# Patient Record
Sex: FEMALE | Race: WHITE | NOT HISPANIC OR LATINO | Employment: OTHER | URBAN - NONMETROPOLITAN AREA
[De-identification: names, ages, dates, MRNs, and addresses within clinical notes are randomized per-mention and may not be internally consistent; named-entity substitution may affect disease eponyms.]

---

## 2021-07-27 ENCOUNTER — OFFICE VISIT (OUTPATIENT)
Dept: CARDIOLOGY | Facility: CLINIC | Age: 41
End: 2021-07-27

## 2021-07-27 ENCOUNTER — PATIENT ROUNDING (BHMG ONLY) (OUTPATIENT)
Dept: CARDIOLOGY | Facility: CLINIC | Age: 41
End: 2021-07-27

## 2021-07-27 VITALS
SYSTOLIC BLOOD PRESSURE: 120 MMHG | WEIGHT: 187.4 LBS | DIASTOLIC BLOOD PRESSURE: 80 MMHG | HEIGHT: 63 IN | BODY MASS INDEX: 33.2 KG/M2 | HEART RATE: 76 BPM

## 2021-07-27 DIAGNOSIS — R01.1 MURMUR, CARDIAC: ICD-10-CM

## 2021-07-27 DIAGNOSIS — E55.9 VITAMIN D DEFICIENCY: ICD-10-CM

## 2021-07-27 DIAGNOSIS — E78.00 HYPERCHOLESTEREMIA: ICD-10-CM

## 2021-07-27 DIAGNOSIS — E88.81 METABOLIC SYNDROME: ICD-10-CM

## 2021-07-27 DIAGNOSIS — R00.0 TACHYCARDIA: Primary | ICD-10-CM

## 2021-07-27 PROCEDURE — 93000 ELECTROCARDIOGRAM COMPLETE: CPT | Performed by: INTERNAL MEDICINE

## 2021-07-27 PROCEDURE — 99244 OFF/OP CNSLTJ NEW/EST MOD 40: CPT | Performed by: INTERNAL MEDICINE

## 2021-07-27 RX ORDER — CHOLECALCIFEROL (VITAMIN D3) 50 MCG
2000 TABLET ORAL DAILY
COMMUNITY

## 2021-07-27 NOTE — PROGRESS NOTES
July 27, 2021    Hello, may I speak with Tika Agee?    My name is Narcisa Merritt       I am  with [facility]  [facility]  [address] [place] [address] [phone].    Before we get started may I verify your date of birth? 1980    I am calling to officially welcome you to our practice and ask about your recent visit. Is this a good time to talk? yes    Tell me about your visit with us. What things went well?  Doctor and everyone was very nice and didn't have to wait very long .       We're always looking for ways to make our patients' experiences even better. Do you have recommendations on ways we may improve?  no-no problems     Overall were you satisfied with your first visit to our practice? Yes it was great        I appreciate you taking the time to speak with me today. Is there anything else I can do for you? no      Thank you, and have a great day.

## 2021-07-27 NOTE — PROGRESS NOTES
Chief Complaint   Patient presents with   • Establish Care     Referred per PCP for Tachycardia . Patient has not worn a Cardiac monitor at this time. She has never saw Cardiology   • Rapid Heart Rate     Patient reports wearing a Fitbit and it has shown HR as high as 200 on occasion . she has no symptoms when this happens   • LABS     Current labs 2021 on chart        CARDIAC COMPLAINTS  Tachycardia      Subjective   Tika Agee is a 41 y.o. female came in today for her initial cardiac evaluation.  She has no previously diagnosed cardiac history other than history of hypercholesterolemia in the past.  She was seen at your office for routine checkup and she mentioned that Fitbit showed that her heart rate has been over 200 couple of times over the last week but she also said that she has been having these episodes about once or twice a month.  It occurs when she does any exertion and it also occurred on a day when she woke up and there was a record of more than 200 heart rate while she was sleeping.  She is completely asymptomatic.  She denies having any palpitation, dizziness or loss of consciousness.  She denies having any increased sweating.  She does notice some increase in the fatigability.  She did undergo some lab work and found to have a normal blood count, her cholesterol was elevated at 217 with the LDL of 139.  Her thyroid function test was normal.  Her electrolytes, renal function and liver function was normal.  She has no history of smoking, alcohol or drug abuse.  Her mother has hypertension.  Sister  with hydrocephalus.  No history of any sudden cardiac arrest in the family.  She does not remember anyone in the family had a pacemaker.  History reviewed. No pertinent surgical history.    Current Outpatient Medications   Medication Sig Dispense Refill   • Cholecalciferol (Vitamin D) 50 MCG ( [place]) tablet Take 2,000 Units by mouth Daily.       No current facility-administered medications for  "this visit.           ALLERGIES:  Patient has no known allergies.    Past Medical History:   Diagnosis Date   • Hyperlipidemia        Social History     Tobacco Use   Smoking Status Never Smoker   Smokeless Tobacco Never Used          Family History   Problem Relation Age of Onset   • Hypertension Mother    • Hydrocephalus Sister    • Breast cancer Maternal Grandmother    • Hypertension Maternal Grandmother    • Brain cancer Maternal Grandfather        Review of Systems   Constitutional: Negative for decreased appetite and malaise/fatigue.   HENT: Negative for congestion and sore throat.    Eyes: Negative for blurred vision.   Cardiovascular: Negative for chest pain and palpitations.   Respiratory: Negative for shortness of breath and snoring.    Endocrine: Negative for cold intolerance and heat intolerance.   Hematologic/Lymphatic: Negative for adenopathy. Does not bruise/bleed easily.   Skin: Negative for itching, nail changes and skin cancer.   Musculoskeletal: Negative for arthritis and myalgias.   Gastrointestinal: Negative for abdominal pain, dysphagia and heartburn.   Genitourinary: Negative for bladder incontinence and frequency.   Neurological: Negative for dizziness, light-headedness, seizures and vertigo.   Psychiatric/Behavioral: Negative for altered mental status.   Allergic/Immunologic: Negative for environmental allergies and hives.       Diabetes- No  Thyroid- normal    Objective     /80 (BP Location: Left arm)   Pulse 76   Ht 160 cm (63\")   Wt 85 kg (187 lb 6.4 oz)   BMI 33.20 kg/m²     Vitals and nursing note reviewed.   Constitutional:       Appearance: Healthy appearance. Not in distress.   Eyes:      Conjunctiva/sclera: Conjunctivae normal.      Pupils: Pupils are equal, round, and reactive to light.   HENT:      Head: Normocephalic.   Pulmonary:      Breath sounds: Normal breath sounds.   Cardiovascular:      PMI at left midclavicular line. Normal rate. Regular rhythm.      Murmurs: " There is a systolic murmur.   Abdominal:      General: Bowel sounds are normal.      Palpations: Abdomen is soft.   Musculoskeletal: Normal range of motion.      Cervical back: Normal range of motion and neck supple. Skin:     General: Skin is warm and dry.   Neurological:      Mental Status: Alert, oriented to person, place, and time and oriented to person, place and time.           ECG 12 Lead    Date/Time: 7/27/2021 1:40 PM  Performed by: Haris Vilchis MD  Authorized by: Haris Vilchis MD   Comparison: compared with previous ECG from 6/25/2021  Similar to previous ECG  Rhythm: sinus rhythm  Rate: normal  QRS axis: normal  Other findings: left atrial abnormality    Clinical impression: non-specific ECG              Assessment/Plan   Patient's Body mass index is 33.2 kg/m². indicating that she is obese (BMI >30). Obesity-related health conditions include the following: none. Obesity is newly identified. BMI is is above average; BMI management plan is completed. We discussed low calorie, low carb based diet program, portion control and increasing exercise..     Diagnoses and all orders for this visit:    1. Tachycardia (Primary)  -     Adult Transthoracic Echo Complete W/ Cont if Necessary Per Protocol; Future  -     Mobile Cardiac Outpatient Telemetry; Future    2. Hypercholesteremia    3. Metabolic syndrome    4. Vitamin D deficiency    5. Murmur, cardiac  -     Adult Transthoracic Echo Complete W/ Cont if Necessary Per Protocol; Future       At baseline her heart rate is stable.  Her blood pressure is normal.  Her EKG shows normal sinus rhythm, normal HI interval, left atrial enlargement.  The QTc interval is normal.  She has no delta waves noted.  Her clinical examination reveals a BMI around 33.  Her cardiovascular examination reveals short systolic murmur at the mitral area.  She has normal peripheral pulse and no pedal edema    Regarding the tachycardia, I am not sure whether the Fitbit is  reading it correctly or is it is an artifact but since she did have an episode while she was sleeping need to rule out whether she had any episodes of PAF or PSVT.  I placed an extended monitor for the next 1 month to see whether there is any transient asymptomatic arrhythmia.  If it is due to PSVT then we can either try with a low-dose of beta-blockers or class Ic antiarrhythmic medication.  I also schedule her to undergo an echocardiogram to evaluate for any structural heart disease.  She also needed to evaluate the LV wall thickness, valvular structures as well as the PA pressure    Regarding her hypercholesterolemia, it is slightly elevated but I do not think she needs to be on any medication at this time.  She can try strict dietary restriction.  Given the fact that she is also has metabolic syndrome, I talked to her about Mediterranean diet and gave her papers regarding that.  She needs to have her lipids rechecked in about 6 months.    Regarding the cardiac murmur, it appears to be more of mitral regurgitation.  Will review the echocardiogram to evaluate the LV function, valvular structures as well as the PA pressure.    Based on the results of the monitor, further recommendations will be made.                 Electronically signed by Haris Vilchis MD July 27, 2021 13:27 EDT

## 2021-07-27 NOTE — PATIENT INSTRUCTIONS
Mediterranean Diet  A Mediterranean diet refers to food and lifestyle choices that are based on the traditions of countries located on the [place]. This way of eating has been shown to help prevent certain conditions and improve outcomes for people who have chronic diseases, like kidney disease and heart disease.  What are tips for following this plan?  Lifestyle  · Cook and eat meals together with your family, when possible.  · Drink enough fluid to keep your urine clear or pale yellow.  · Be physically active every day. This includes:  ? Aerobic exercise like running or swimming.  ? Leisure activities like gardening, walking, or housework.  · Get 7-8 hours of sleep each night.  · If recommended by your health care provider, drink red wine in moderation. This means 1 glass a day for nonpregnant women and 2 glasses a day for men. A glass of wine equals 5 oz (150 mL).  Reading food labels    · Check the serving size of packaged foods. For foods such as rice and pasta, the serving size refers to the amount of cooked product, not dry.  · Check the total fat in packaged foods. Avoid foods that have saturated fat or trans fats.  · Check the ingredients list for added sugars, such as corn syrup.  Shopping  · At the grocery store, buy most of your food from the areas near the walls of the store. This includes:  ? Fresh fruits and vegetables (produce).  ? Grains, beans, nuts, and seeds. Some of these may be available in unpackaged forms or large amounts (in bulk).  ? Fresh seafood.  ? Poultry and eggs.  ? Low-fat dairy products.  · Buy whole ingredients instead of prepackaged foods.  · Buy fresh fruits and vegetables in-season from local farmers markets.  · Buy frozen fruits and vegetables in resealable bags.  · If you do not have access to quality fresh seafood, buy precooked frozen shrimp or canned fish, such as tuna, salmon, or sardines.  · Buy small amounts of raw or cooked vegetables, salads, or olives from  the deli or salad bar at your store.  · Stock your pantry so you always have certain foods on hand, such as olive oil, canned tuna, canned tomatoes, rice, pasta, and beans.  Cooking  · Cook foods with extra-virgin olive oil instead of using butter or other vegetable oils.  · Have meat as a side dish, and have vegetables or grains as your main dish. This means having meat in small portions or adding small amounts of meat to foods like pasta or stew.  · Use beans or vegetables instead of meat in common dishes like chili or lasagna.  · [place] with different cooking methods. Try roasting or broiling vegetables instead of steaming or sautéeing them.  · Add frozen vegetables to soups, stews, pasta, or rice.  · Add nuts or seeds for added healthy fat at each meal. You can add these to yogurt, salads, or vegetable dishes.  · Marinate fish or vegetables using olive oil, lemon juice, garlic, and fresh herbs.  Meal planning    · Plan to eat 1 vegetarian meal one day each week. Try to work up to 2 vegetarian meals, if possible.  · Eat seafood 2 or more times a week.  · Have healthy snacks readily available, such as:  ? Vegetable sticks with hummus.  ? Greek yogurt.  ? Fruit and nut trail mix.  · Eat balanced meals throughout the week. This includes:  ? Fruit: 2-3 servings a day  ? Vegetables: 4-5 servings a day  ? Low-fat dairy: 2 servings a day  ? Fish, poultry, or lean meat: 1 serving a day  ? Beans and legumes: 2 or more servings a week  ? Nuts and seeds: 1-2 servings a day  ? Whole grains: 6-8 servings a day  ? Extra-virgin olive oil: 3-4 servings a day  · Limit red meat and sweets to only a few servings a month  What are my food choices?  · Mediterranean diet  ? Recommended  § Grains: Whole-grain pasta. Brown rice. Bulgar wheat. Polenta. Couscous. Whole-wheat bread. Oatmeal. Quinoa.  § Vegetables: Artichokes. Beets. Broccoli. Cabbage. Carrots. Eggplant. Green beans. Chard. Kale. Spinach. Onions. Leeks. Peas. Squash.  Tomatoes. Peppers. Radishes.  § Fruits: Apples. Apricots. Avocado. Berries. Bananas. Cherries. Dates. Figs. Grapes. Sergio. Melon. Oranges. Peaches. Plums. Pomegranate.  § Meats and other protein foods: Beans. Almonds. Sunflower seeds. Pine nuts. Peanuts. Cod. [place]. Scallops. Shrimp. Tuna. Tilapia. Clams. Oysters. Eggs.  § Dairy: Low-fat milk. Cheese. Greek yogurt.  § Beverages: Water. Red wine. Herbal tea.  § Fats and oils: Extra virgin olive oil. Avocado oil. Grape seed oil.  § Sweets and desserts: Greek yogurt with honey. Baked apples. Poached pears. Trail mix.  § Seasoning and other foods: Basil. Cilantro. Coriander. Cumin. Mint. Parsley. [place]. Rosemary. Tarragon. Garlic. Oregano. Thyme. Pepper. Balsalmic vinegar. Tahini. Hummus. Tomato sauce. Olives. Mushrooms.  ? Limit these  § Grains: Prepackaged pasta or rice dishes. Prepackaged cereal with added sugar.  § Vegetables: Deep fried potatoes (french fries).  § Fruits: Fruit canned in syrup.  § Meats and other protein foods: Beef. Pork. Lamb. Poultry with skin. Hot dogs. Joshi.  § Dairy: Ice cream. Sour cream. Whole milk.  § Beverages: Juice. Sugar-sweetened soft drinks. Beer. Liquor and spirits.  § Fats and oils: Butter. Canola oil. Vegetable oil. Beef fat (tallow). Lard.  § Sweets and desserts: Cookies. Cakes. Pies. Candy.  § Seasoning and other foods: Mayonnaise. Premade sauces and marinades.  The items listed may not be a complete list. Talk with your dietitian about what dietary choices are right for you.  Summary  · The Mediterranean diet includes both food and lifestyle choices.  · Eat a variety of fresh fruits and vegetables, beans, nuts, seeds, and whole grains.  · Limit the amount of red meat and sweets that you eat.  · Talk with your health care provider about whether it is safe for you to drink red wine in moderation. This means 1 glass a day for nonpregnant women and 2 glasses a day for men. A glass of wine equals 5 oz (150 mL).  This information  is not intended to replace advice given to you by your health care provider. Make sure you discuss any questions you have with your health care provider.  Document Revised: 08/17/2017 Document Reviewed: 08/10/2017  Elsevier Patient Education © 2020 [employer]

## 2021-08-12 PROCEDURE — 93306 TTE W/DOPPLER COMPLETE: CPT | Performed by: INTERNAL MEDICINE

## 2021-08-16 ENCOUNTER — OUTSIDE FACILITY SERVICE (OUTPATIENT)
Dept: CARDIOLOGY | Facility: CLINIC | Age: 41
End: 2021-08-16